# Patient Record
Sex: MALE | Race: WHITE | URBAN - METROPOLITAN AREA
[De-identification: names, ages, dates, MRNs, and addresses within clinical notes are randomized per-mention and may not be internally consistent; named-entity substitution may affect disease eponyms.]

---

## 2018-05-05 ENCOUNTER — EMERGENCY (EMERGENCY)
Facility: HOSPITAL | Age: 48
LOS: 1 days | Discharge: HOME | End: 2018-05-05
Attending: EMERGENCY MEDICINE
Payer: COMMERCIAL

## 2018-05-05 VITALS — HEIGHT: 65 IN | WEIGHT: 134.92 LBS

## 2018-05-05 VITALS
SYSTOLIC BLOOD PRESSURE: 124 MMHG | HEART RATE: 80 BPM | DIASTOLIC BLOOD PRESSURE: 74 MMHG | RESPIRATION RATE: 18 BRPM | OXYGEN SATURATION: 99 % | TEMPERATURE: 98 F

## 2018-05-05 PROCEDURE — 12002 RPR S/N/AX/GEN/TRNK2.6-7.5CM: CPT

## 2018-05-05 PROCEDURE — 99283 EMERGENCY DEPT VISIT LOW MDM: CPT | Mod: 25

## 2018-05-05 PROCEDURE — 90715 TDAP VACCINE 7 YRS/> IM: CPT

## 2018-05-05 PROCEDURE — 90471 IMMUNIZATION ADMIN: CPT

## 2018-05-05 RX ORDER — TETANUS TOXOID, REDUCED DIPHTHERIA TOXOID AND ACELLULAR PERTUSSIS VACCINE, ADSORBED 5; 2.5; 8; 8; 2.5 [IU]/.5ML; [IU]/.5ML; UG/.5ML; UG/.5ML; UG/.5ML
0.5 SUSPENSION INTRAMUSCULAR ONCE
Qty: 0 | Refills: 0 | Status: COMPLETED | OUTPATIENT
Start: 2018-05-05 | End: 2018-05-05

## 2018-05-05 RX ADMIN — TETANUS TOXOID, REDUCED DIPHTHERIA TOXOID AND ACELLULAR PERTUSSIS VACCINE, ADSORBED 0.5 MILLILITER(S): 5; 2.5; 8; 8; 2.5 SUSPENSION INTRAMUSCULAR at 08:07

## 2018-05-05 NOTE — ED PROVIDER NOTE - PHYSICAL EXAMINATION
General: Well appearing, sitting comfortably Cardio: S1/S2, no murmurs Resp: Clear to auscultation bilaterally, no wheezes, rales or rhonchi MSK: FROM in all extremities, neurovasculary intact, DP palpable left foot Skin: Laceration left upper leg above patella, 3. 5 cm, actively bleeding

## 2018-05-05 NOTE — ED ADULT TRIAGE NOTE - CHIEF COMPLAINT QUOTE
Patient arrived to ED today with c/o laceration to his left lower leg.  Patient states while at work today the chainsaw he was using kicked back into his leg.  Bleeding is controlled, 5 inch laceration present.

## 2018-05-05 NOTE — ED PROVIDER NOTE - OBJECTIVE STATEMENT
Patient is a 46 y/o male c/o of laceration to left upper leg. Patient states he cut himself with a chainsaw this morning. Patient denies any other complaints. Patient denies knowing when last tetanus shot was. Patient denies numbness or loss of sensation, decreased ROM.

## 2018-05-05 NOTE — ED PROVIDER NOTE - ATTENDING CONTRIBUTION TO CARE
47 year old sustained a laceration to his left leg while using a chain saw at home.  Laceration 4.5 inches noted.  Laceration repaired and tetanus updated,

## 2018-05-05 NOTE — ED ADULT NURSE NOTE - OBJECTIVE STATEMENT
pt alert and awake x3, arrived to ED with approx 4 inch LAC to left lower extremity above the knee, states it was hit by a chainsaw, bleeding controlled pta, ambulates with steady gait, denies chest pain

## 2021-04-08 NOTE — ED PROVIDER NOTE - MEDICAL DECISION MAKING DETAILS
Laceration repair, wound care discussed with patient, removal of sutures in 7 - 10 days, pt explained d/c instructions, pt verbalizes understanding d/c instructions Family

## 2024-02-09 PROBLEM — Z00.00 ENCOUNTER FOR PREVENTIVE HEALTH EXAMINATION: Status: ACTIVE | Noted: 2024-02-09

## 2024-03-01 ENCOUNTER — APPOINTMENT (OUTPATIENT)
Dept: ORTHOPEDIC SURGERY | Facility: CLINIC | Age: 54
End: 2024-03-01
Payer: COMMERCIAL

## 2024-03-01 VITALS
DIASTOLIC BLOOD PRESSURE: 69 MMHG | SYSTOLIC BLOOD PRESSURE: 109 MMHG | WEIGHT: 131 LBS | BODY MASS INDEX: 21.83 KG/M2 | HEIGHT: 65 IN

## 2024-03-01 DIAGNOSIS — Z78.9 OTHER SPECIFIED HEALTH STATUS: ICD-10-CM

## 2024-03-01 PROCEDURE — 99203 OFFICE O/P NEW LOW 30 MIN: CPT

## 2024-03-01 NOTE — ADDENDUM
[FreeTextEntry1] : This note was authored by Hieu Stapleton working as a medical scribe for Dr. Victorino Barreto. The note was reviewed, edited, and revised by Dr. Victorino Barreto whom is in agreement with the exam findings, imaging findings, and treatment plan. 03/01/2024

## 2024-03-01 NOTE — PHYSICAL EXAM
[de-identified] : The patient appears well nourished and in no apparent distress.  The patient is alert and oriented to person, place, and time.   Affect and mood appear normal. The head is normocephalic and atraumatic.  The eyes reveal normal sclera and extra ocular muscles are intact. The tongue is midline with no apparent lesions.  Skin shows normal turgor with no evidence of eczema or psoriasis.  No respiratory distress noted.  Sensation grossly intact.		  [de-identified] : Exam of the right knee shows there is no pain with Vilma's testing.  There is no joint line tenderness. Negative anterior drawer. Neutral alignment. There is no effusion. There is no pain with range of motion.  5/5 motor strength bilaterally distally. Sensation intact distally.		  [de-identified] : X-ray: 4 views of the right knee demonstrate mild valgus arthritis.

## 2024-03-01 NOTE — DISCUSSION/SUMMARY
[de-identified] : KADEEM HOWE is a 53 year old male who presents with right knee mild valgus arthritis. The patient will continue physical therapy as it was previously prescribed to him. The patient will follow up if his pain worsens, at which point we may consider sending him for an MRI of the right knee.

## 2024-03-01 NOTE — HISTORY OF PRESENT ILLNESS
[de-identified] : Mr. KADEEM HOWE is a 53 year old male presenting for evaluation of right knee pain for the past few months. Patient describes the pain as a discomfort anteriorly, worse with weightbearing activity as well as deep flexion of the knee.  He works as a  so is standing all day long.  He denies any falls or trauma.  He denies any swelling in the knee.  Patient has not had any MRI thus far.  He has not had physical therapy or injections thus far.